# Patient Record
Sex: FEMALE | Race: WHITE | NOT HISPANIC OR LATINO | URBAN - METROPOLITAN AREA
[De-identification: names, ages, dates, MRNs, and addresses within clinical notes are randomized per-mention and may not be internally consistent; named-entity substitution may affect disease eponyms.]

---

## 2017-04-10 ENCOUNTER — APPOINTMENT (RX ONLY)
Dept: URBAN - METROPOLITAN AREA CLINIC 141 | Facility: CLINIC | Age: 82
Setting detail: DERMATOLOGY
End: 2017-04-10

## 2017-04-10 DIAGNOSIS — L85.3 XEROSIS CUTIS: ICD-10-CM

## 2017-04-10 PROBLEM — F41.9 ANXIETY DISORDER, UNSPECIFIED: Status: ACTIVE | Noted: 2017-04-10

## 2017-04-10 PROBLEM — M12.9 ARTHROPATHY, UNSPECIFIED: Status: ACTIVE | Noted: 2017-04-10

## 2017-04-10 PROCEDURE — ? TREATMENT REGIMEN

## 2017-04-10 PROCEDURE — ? PRESCRIPTION

## 2017-04-10 PROCEDURE — ? COUNSELING

## 2017-04-10 PROCEDURE — 99202 OFFICE O/P NEW SF 15 MIN: CPT

## 2017-04-10 RX ORDER — TRIAMCINOLONE ACETONIDE 1 MG/G
CREAM TOPICAL BID
Qty: 1 | Refills: 2 | Status: ERX | COMMUNITY
Start: 2017-04-10

## 2017-04-10 RX ADMIN — TRIAMCINOLONE ACETONIDE: 1 CREAM TOPICAL at 00:00

## 2017-04-10 ASSESSMENT — LOCATION DETAILED DESCRIPTION DERM: LOCATION DETAILED: SUPERIOR THORACIC SPINE

## 2017-04-10 ASSESSMENT — LOCATION SIMPLE DESCRIPTION DERM: LOCATION SIMPLE: UPPER BACK

## 2017-04-10 ASSESSMENT — LOCATION ZONE DERM: LOCATION ZONE: TRUNK

## 2017-04-10 NOTE — PROCEDURE: MIPS QUALITY
Quality 154 Part A: Falls: Risk Assessment (Should Be Reported With Measure 155.): Falls risk assessment completed and documented in the past 12 months.
Quality 111:Pneumonia Vaccination Status For Older Adults: Pneumococcal Vaccination Previously Received
Quality 226: Preventive Care And Screening: Tobacco Use: Screening And Cessation Intervention: Patient screened for tobacco and never smoked
Quality 154 Part B: Falls: Risk Screening (Should Be Reported With Measure 155.): Patient screened for future fall risk; documentation of no falls in the past year or only one fall without injury in the past year
Detail Level: Detailed
Quality 155 (Denominator): Falls Plan Of Care: Plan of Care not Documented, Reason not Otherwise Specified
Quality 47: Advance Care Plan: Advance Care Planning discussed and documented; advance care plan or surrogate decision maker documented in the medical record.
Quality 110: Preventive Care And Screening: Influenza Immunization: Influenza Immunization previously received during influenza season
Quality 431: Preventive Care And Screening: Unhealthy Alcohol Use - Screening: Patient screened for unhealthy alcohol use using a single question and scores less than 2 times per year
Quality 131: Pain Assessment And Follow-Up: Pain assessment using a standardized tool is documented as negative, no follow-up plan required